# Patient Record
Sex: MALE | Race: OTHER | URBAN - METROPOLITAN AREA
[De-identification: names, ages, dates, MRNs, and addresses within clinical notes are randomized per-mention and may not be internally consistent; named-entity substitution may affect disease eponyms.]

---

## 2023-12-07 ENCOUNTER — OFFICE VISIT (OUTPATIENT)
Dept: AUDIOLOGY | Facility: CLINIC | Age: 28
End: 2023-12-07

## 2023-12-07 ENCOUNTER — OFFICE VISIT (OUTPATIENT)
Dept: OTOLARYNGOLOGY | Facility: CLINIC | Age: 28
End: 2023-12-07

## 2023-12-07 VITALS — WEIGHT: 170 LBS

## 2023-12-07 DIAGNOSIS — H90.42 SENSORINEURAL HEARING LOSS (SNHL) OF LEFT EAR WITH UNRESTRICTED HEARING OF RIGHT EAR: Primary | ICD-10-CM

## 2023-12-07 DIAGNOSIS — H91.93 BILATERAL HEARING LOSS, UNSPECIFIED HEARING LOSS TYPE: Primary | ICD-10-CM

## 2023-12-07 PROCEDURE — 92557 COMPREHENSIVE HEARING TEST: CPT | Performed by: AUDIOLOGIST

## 2023-12-07 PROCEDURE — 99203 OFFICE O/P NEW LOW 30 MIN: CPT | Performed by: OTOLARYNGOLOGY

## 2023-12-07 PROCEDURE — 92567 TYMPANOMETRY: CPT | Performed by: AUDIOLOGIST

## 2023-12-07 RX ORDER — EMTRICITABINE AND TENOFOVIR ALAFENAMIDE 200; 25 MG/1; MG/1
1 TABLET ORAL DAILY
COMMUNITY
Start: 2023-10-23

## 2023-12-07 RX ORDER — CEPHALEXIN 500 MG/1
500 CAPSULE ORAL EVERY 12 HOURS
COMMUNITY
Start: 2023-12-03

## 2023-12-07 RX ORDER — PREDNISONE 10 MG/1
TABLET ORAL
Qty: 44 TABLET | Refills: 0 | Status: SHIPPED | OUTPATIENT
Start: 2023-12-07

## 2023-12-07 RX ORDER — VALACYCLOVIR HYDROCHLORIDE 500 MG/1
500 TABLET, FILM COATED ORAL EVERY 8 HOURS
Qty: 21 TABLET | Refills: 0 | Status: SHIPPED | OUTPATIENT
Start: 2023-12-07 | End: 2023-12-14

## 2023-12-08 NOTE — PROGRESS NOTES
Valene Phoenix is a 33year old male. Chief Complaint   Patient presents with    Follow - Up     ER ringing in left ear, taking oral abx    Nose Problem     Pt reports sinus infections and tonsillitis. HISTORY OF PRESENT ILLNESS    He presents with a history of developing an acute sinus infection and having episodes of tonsillitis in the past.  States that 2 days before Thanksgiving which is about 2 weeks and 2 days ago he started developing some issues with his ear. Went to urgent care on Thanksgiving day 2 weeks ago and at that time was prescribed some low-dose steroids that he took 1 tab daily for about 4 to 5 days. No improvement in his symptoms overall. Denies any dizziness does admit to fullness and pressure sensation in the ear as well as a loss of hearing. Audiogram was performed today based on his complaints and concerns with the finding of normal hearing on the right right with a mild sensorineural hearing loss which is asymmetric on the left with normal tympanograms and speech discrimination scores. Currently without any other signs, symptoms or complaints. Previously tested for COVID which was negative. No longer having cough or throat symptoms. Social History     Socioeconomic History    Marital status:    Tobacco Use    Smoking status: Never     Passive exposure: Never    Smokeless tobacco: Never   Vaping Use    Vaping Use: Never used   Substance and Sexual Activity    Alcohol use: Never    Drug use: Never       History reviewed. No pertinent family history. Past Medical History:   Diagnosis Date    Sinusitis     Tonsillitis        History reviewed. No pertinent surgical history. REVIEW OF SYSTEMS    System Neg/Pos Details   Constitutional Negative Fatigue, fever and weight loss. ENMT Negative Drooling. Eyes Negative Blurred vision and vision changes. Respiratory Negative Dyspnea and wheezing.    Cardio Negative Chest pain, irregular heartbeat/palpitations and syncope. GI Negative Abdominal pain and diarrhea. Endocrine Negative Cold intolerance and heat intolerance. Neuro Negative Tremors. Psych Negative Anxiety and depression. Integumentary Negative Frequent skin infections, pigment change and rash. Hema/Lymph Negative Easy bleeding and easy bruising. PHYSICAL EXAM    Wt 170 lb (77.1 kg)        Constitutional Normal Overall appearance - Normal.   Psychiatric Normal Orientation - Oriented to time, place, person & situation. Appropriate mood and affect. Neck Exam Normal Inspection - Normal. Palpation - Normal. Parotid gland - Normal. Thyroid gland - Normal.   Eyes Normal Conjunctiva - Right: Normal, Left: Normal. Pupil - Right: Normal, Left: Normal. Fundus - Right: Normal, Left: Normal.   Neurological Normal Memory - Normal. Cranial nerves - Cranial nerves II through XII grossly intact. Head/Face Normal Facial features - Normal. Eyebrows - Normal. Skull - Normal.        Nasopharynx Normal External nose - Normal. Lips/teeth/gums - Normal. Tonsils - Normal. Oropharynx - Normal.   Ears Normal Inspection - Right: Normal, Left: Normal. Canal - Right: Normal, Left: Normal. TM - Right: Normal, Left: Normal.   Skin Normal Inspection - Normal.        Lymph Detail Normal Submental. Submandibular. Anterior cervical. Posterior cervical. Supraclavicular. Nose/Mouth/Throat Normal External nose - Normal. Lips/teeth/gums - Normal. Tonsils - Normal. Oropharynx - Normal.   Nose/Mouth/Throat Normal Nares - Right: Normal Left: Normal. Septum -Normal  Turbinates - Right: Normal, Left: Normal.       Current Outpatient Medications:     cephalexin 500 MG Oral Cap, Take 1 capsule (500 mg total) by mouth Q12H., Disp: , Rfl:     DESCOVY 200-25 MG Oral Tab, Take 1 tablet by mouth daily. , Disp: , Rfl:     Pseudoephedrine HCl, Deter, 30 MG Oral Tablet Abuse-Deterrent, Take 30 mg by mouth., Disp: , Rfl:     predniSONE 10 MG Oral Tab, 6 tablets daily day one through 5, 4 tablets daily on days  6 and 7, 2 tablets daily on days 8 and 9, One tablet daily on days 10 and 11., Disp: 44 tablet, Rfl: 0    valACYclovir 500 MG Oral Tab, Take 1 tablet (500 mg total) by mouth every 8 (eight) hours for 7 days. , Disp: 21 tablet, Rfl: 0  ASSESSMENT AND PLAN    1. Bilateral hearing loss, unspecified hearing loss type  Audiogram was reviewed with the patient in the office today demonstrating asymmetric sensorineural hearing loss on the left which is mild in nature at all frequencies. Normal tympanograms and speech discrimination scores. We discussed possibly that he may have had an underlying viral etiology of his sinusitis leading to his sudden onset hearing loss. I have recommended that he start the use of prednisone with a burst and taper over 11 days and Valtrex and return to see me in 2 weeks with repeat audiogram.  - OP REFERRAL TO AUDIOLOGY        This note was prepared using [facility] voice recognition dictation software. As a result errors may occur. When identified these errors have been corrected. While every attempt is made to correct errors during dictation discrepancies may still exist    Alban Lira MD    12/7/2023    9:55 PM

## 2023-12-12 ENCOUNTER — TELEPHONE (OUTPATIENT)
Dept: OTOLARYNGOLOGY | Facility: CLINIC | Age: 28
End: 2023-12-12

## 2023-12-12 NOTE — TELEPHONE ENCOUNTER
Pt stating he is experiencing  swollen glands  with pain,struggling to eat. Would like to speak to a nurse. Please advise     [place]ovenian SPEAKER

## 2023-12-12 NOTE — TELEPHONE ENCOUNTER
Per pt c/o of swollen lymph  nodes bilateral neck, pain 9/10,  especially with movement-side to side or bending down and sore throat. Pt denies fever. Pt has been taking valtrex and prednisone as prescribed. Per  pt to follow up with PCP regarding issues or can go to urgent care. Pt informed of recommendations.

## 2023-12-21 ENCOUNTER — OFFICE VISIT (OUTPATIENT)
Dept: AUDIOLOGY | Facility: CLINIC | Age: 28
End: 2023-12-21

## 2023-12-21 ENCOUNTER — OFFICE VISIT (OUTPATIENT)
Dept: OTOLARYNGOLOGY | Facility: CLINIC | Age: 28
End: 2023-12-21

## 2023-12-21 VITALS — WEIGHT: 170 LBS

## 2023-12-21 DIAGNOSIS — H91.90 HEARING LOSS, UNSPECIFIED HEARING LOSS TYPE, UNSPECIFIED LATERALITY: Primary | ICD-10-CM

## 2023-12-21 DIAGNOSIS — H90.42 SENSORINEURAL HEARING LOSS (SNHL) OF LEFT EAR WITH UNRESTRICTED HEARING OF RIGHT EAR: Primary | ICD-10-CM

## 2023-12-21 PROCEDURE — 99214 OFFICE O/P EST MOD 30 MIN: CPT | Performed by: OTOLARYNGOLOGY

## 2023-12-21 RX ORDER — AZELASTINE 1 MG/ML
2 SPRAY, METERED NASAL 2 TIMES DAILY
Qty: 30 ML | Refills: 3 | Status: SHIPPED | OUTPATIENT
Start: 2023-12-21

## 2023-12-21 RX ORDER — MONTELUKAST SODIUM 10 MG/1
10 TABLET ORAL NIGHTLY
Qty: 30 TABLET | Refills: 3 | Status: SHIPPED | OUTPATIENT
Start: 2023-12-21

## 2023-12-21 NOTE — PROGRESS NOTES
Abigail Corona is a 33year old male. Chief Complaint   Patient presents with    Follow - Up     F/up bilateral hearing loss with repeat audiogram       HISTORY OF PRESENT ILLNESS  He presents with a history of developing an acute sinus infection and having episodes of tonsillitis in the past.  States that 2 days before Thanksgiving which is about 2 weeks and 2 days ago he started developing some issues with his ear. Went to urgent care on Thanksgiving day 2 weeks ago and at that time was prescribed some low-dose steroids that he took 1 tab daily for about 4 to 5 days. No improvement in his symptoms overall. Denies any dizziness does admit to fullness and pressure sensation in the ear as well as a loss of hearing. Audiogram was performed today based on his complaints and concerns with the finding of normal hearing on the right right with a mild sensorineural hearing loss which is asymmetric on the left with normal tympanograms and speech discrimination scores. Currently without any other signs, symptoms or complaints. Previously tested for COVID which was negative. No longer having cough or throat symptoms. 12/21/23 audiogram was performed today demonstrating slight improvement in his pure-tone thresholds at the highest frequencies on the left. Went from 60-45 dB at about 8000 Hz. No other changes at other frequencies. He notes his ear to be less stable and the ringing to be less loud. Overall he feels that the not left ear. Also complains of chronic nasal obstruction cannot breathe out of his nose especially when he sleeps he feels that he gets super congested and obstructed. Social History     Socioeconomic History    Marital status:    Tobacco Use    Smoking status: Never     Passive exposure: Never    Smokeless tobacco: Never   Vaping Use    Vaping Use: Never used   Substance and Sexual Activity    Alcohol use: Never    Drug use: Never       History reviewed.  No pertinent family history. Past Medical History:   Diagnosis Date    Sinusitis     Tonsillitis        History reviewed. No pertinent surgical history. REVIEW OF SYSTEMS    System Neg/Pos Details   Constitutional Negative Fatigue, fever and weight loss. ENMT Negative Drooling. Eyes Negative Blurred vision and vision changes. Respiratory Negative Dyspnea and wheezing. Cardio Negative Chest pain, irregular heartbeat/palpitations and syncope. GI Negative Abdominal pain and diarrhea. Endocrine Negative Cold intolerance and heat intolerance. Neuro Negative Tremors. Psych Negative Anxiety and depression. Integumentary Negative Frequent skin infections, pigment change and rash. Hema/Lymph Negative Easy bleeding and easy bruising. PHYSICAL EXAM    Wt 170 lb (77.1 kg)        Constitutional Normal Overall appearance - Normal.   Psychiatric Normal Orientation - Oriented to time, place, person & situation. Appropriate mood and affect. Neck Exam Normal Inspection - Normal. Palpation - Normal. Parotid gland - Normal. Thyroid gland - Normal.   Eyes Normal Conjunctiva - Right: Normal, Left: Normal. Pupil - Right: Normal, Left: Normal. Fundus - Right: Normal, Left: Normal.   Neurological Normal Memory - Normal. Cranial nerves - Cranial nerves II through XII grossly intact. Head/Face Normal Facial features - Normal. Eyebrows - Normal. Skull - Normal.        Nasopharynx Normal External nose - Normal. Lips/teeth/gums - Normal. Tonsils - Normal. Oropharynx - Normal.   Ears Normal Inspection - Right: Normal, Left: Normal. Canal - Right: Normal, Left: Normal. TM - Right: Normal, Left: Normal.   Skin Normal Inspection - Normal.        Lymph Detail Normal Submental. Submandibular. Anterior cervical. Posterior cervical. Supraclavicular.         Nose/Mouth/Throat Normal External nose - Normal. Lips/teeth/gums - Normal. Tonsils - Normal. Oropharynx - Normal.   Nose/Mouth/Throat Normal Nares - Right: Normal Left: Normal. Septum - deviated to the right turbinates - Right: Normal, Left: Moderate hypertrophy       Current Outpatient Medications:     cephalexin 500 MG Oral Cap, Take 1 capsule (500 mg total) by mouth Q12H., Disp: , Rfl:     DESCOVY 200-25 MG Oral Tab, Take 1 tablet by mouth daily. , Disp: , Rfl:     Pseudoephedrine HCl, Deter, 30 MG Oral Tablet Abuse-Deterrent, Take 30 mg by mouth., Disp: , Rfl:     predniSONE 10 MG Oral Tab, 6 tablets daily day one through 5, 4 tablets daily on days  6 and 7, 2 tablets daily on days 8 and 9, One tablet daily on days 10 and 11., Disp: 44 tablet, Rfl: 0  ASSESSMENT AND PLAN    1. Hearing loss, unspecified hearing loss type, unspecified laterality  Deviated septum to the right. Start Singulair riding the Astelin nasal spray return to see me in 1 month for reevaluation. May benefit from septoplasty and turbinate reduction in the future. Avoid improvement at the highest frequencies on the left side. Normal hearing on the right. Repeat audiogram in 6 months. Greater than 30 minutes spent with patient in discussions regarding his hearing tonsillar issues and nasal issues  - OP REFERRAL TO AUDIOLOGY        This note was prepared using [employer] [facility] [employer] voice recognition dictation software. As a result errors may occur. When identified these errors have been corrected. While every attempt is made to correct errors during dictation discrepancies may still exist    John D. Darlene Halsted, MD    12/21/2023    11:08 AM

## 2024-01-25 ENCOUNTER — OFFICE VISIT (OUTPATIENT)
Dept: OTOLARYNGOLOGY | Facility: CLINIC | Age: 29
End: 2024-01-25

## 2024-01-25 DIAGNOSIS — R06.83 SNORING: Primary | ICD-10-CM

## 2024-01-25 DIAGNOSIS — G47.33 OSA (OBSTRUCTIVE SLEEP APNEA): ICD-10-CM

## 2024-01-25 DIAGNOSIS — J34.2 DEVIATED NASAL SEPTUM: ICD-10-CM

## 2024-01-25 PROCEDURE — 99214 OFFICE O/P EST MOD 30 MIN: CPT | Performed by: OTOLARYNGOLOGY

## 2024-01-25 RX ORDER — FLUTICASONE PROPIONATE 50 MCG
1 SPRAY, SUSPENSION (ML) NASAL 2 TIMES DAILY
Qty: 16 G | Refills: 3 | Status: SHIPPED | OUTPATIENT
Start: 2024-01-25

## 2024-01-25 RX ORDER — AMOXICILLIN AND CLAVULANATE POTASSIUM 875; 125 MG/1; MG/1
1 TABLET, FILM COATED ORAL EVERY 12 HOURS
Qty: 20 TABLET | Refills: 0 | Status: SHIPPED | OUTPATIENT
Start: 2024-01-25

## 2024-01-25 NOTE — PROGRESS NOTES
Gayla Eldridge is a 28 year old male.    Chief Complaint   Patient presents with    Follow - Up     Medication reevaluation, reports worsening.   Snoring difficulty breathing with yellow mucus        HISTORY OF PRESENT ILLNESS  He presents with a history of developing an acute sinus infection and having episodes of tonsillitis in the past.  States that 2 days before Thanksgiving which is about 2 weeks and 2 days ago he started developing some issues with his ear.  Went to urgent care on Thanksgiving day 2 weeks ago and at that time was prescribed some low-dose steroids that he took 1 tab daily for about 4 to 5 days.  No improvement in his symptoms overall.  Denies any dizziness does admit to fullness and pressure sensation in the ear as well as a loss of hearing.  Audiogram was performed today based on his complaints and concerns with the finding of normal hearing on the right right with a mild sensorineural hearing loss which is asymmetric on the left with normal tympanograms and speech discrimination scores.  Currently without any other signs, symptoms or complaints.  Previously tested for COVID which was negative.  No longer having cough or throat symptoms.     12/21/23 audiogram was performed today demonstrating slight improvement in his pure-tone thresholds at the highest frequencies on the left.  Went from 60-45 dB at about 8000 Hz.  No other changes at other frequencies.  He notes his ear to be less stable and the ringing to be less loud.  Overall he feels that the not left ear.  Also complains of chronic nasal obstruction cannot breathe out of his nose especially when he sleeps he feels that he gets super congested and obstructed.     1/25/24 he has been using Claritin-D Singulair Astelin as a spray does not feel that spray is actually helping him.  Now blowing out green-yellowish material and very obstructed bilaterally.  Partner is here with him and states that he notes that he stops breathing at night  and snores heroically.  He has noted obstructive symptoms suggestive of sleep apnea.  Previously noted to have a deviated septum.      Social History     Socioeconomic History    Marital status:    Tobacco Use    Smoking status: Never     Passive exposure: Never    Smokeless tobacco: Never   Vaping Use    Vaping Use: Never used   Substance and Sexual Activity    Alcohol use: Never    Drug use: Never       History reviewed. No pertinent family history.    Past Medical History:   Diagnosis Date    Sinusitis     Tonsillitis        History reviewed. No pertinent surgical history.      REVIEW OF SYSTEMS    System Neg/Pos Details   Constitutional Negative Fatigue, fever and weight loss.   ENMT Negative Drooling.   Eyes Negative Blurred vision and vision changes.   Respiratory Negative Dyspnea and wheezing.   Cardio Negative Chest pain, irregular heartbeat/palpitations and syncope.   GI Negative Abdominal pain and diarrhea.   Endocrine Negative Cold intolerance and heat intolerance.   Neuro Negative Tremors.   Psych Negative Anxiety and depression.   Integumentary Negative Frequent skin infections, pigment change and rash.   Hema/Lymph Negative Easy bleeding and easy bruising.           PHYSICAL EXAM    There were no vitals taken for this visit.       Constitutional Normal Overall appearance - Normal.   Psychiatric Normal Orientation - Oriented to time, place, person & situation. Appropriate mood and affect.   Neck Exam Normal Inspection - Normal. Palpation - Normal. Parotid gland - Normal. Thyroid gland - Normal.   Eyes Normal Conjunctiva - Right: Normal, Left: Normal. Pupil - Right: Normal, Left: Normal. Fundus - Right: Normal, Left: Normal.   Neurological Normal Memory - Normal. Cranial nerves - Cranial nerves II through XII grossly intact.   Head/Face Normal Facial features - Normal. Eyebrows - Normal. Skull - Normal.        Nasopharynx Normal External nose - Normal. Lips/teeth/gums - Normal. Tonsils - Normal.  Oropharynx - Normal.   Ears Normal Inspection - Right: Normal, Left: Normal. Canal - Right: Normal, Left: Normal. TM - Right: Normal, Left: Normal.   Skin Normal Inspection - Normal.        Lymph Detail Normal Submental. Submandibular. Anterior cervical. Posterior cervical. Supraclavicular.        Nose/Mouth/Throat Normal External nose - Normal. Lips/teeth/gums - Normal. Tonsils - Normal. Oropharynx - Normal.   Nose/Mouth/Throat Normal Nares - Right: Normal Left: Normal. Septum -deviated turbinates - Right: Normal, Left: Normal.  Congested nasal mucosa with some mucopurulence noted bilaterally.       Current Outpatient Medications:     montelukast 10 MG Oral Tab, Take 1 tablet (10 mg total) by mouth nightly., Disp: 30 tablet, Rfl: 3    loratadine-pseudoephedrine ER 5-120 MG Oral Tablet 12 Hr, Take 1 tablet by mouth every 12 (twelve) hours., Disp: 60 tablet, Rfl: 3    azelastine 0.1 % Nasal Solution, 2 sprays by Nasal route 2 (two) times daily., Disp: 30 mL, Rfl: 3    cephalexin 500 MG Oral Cap, Take 1 capsule (500 mg total) by mouth Q12H., Disp: , Rfl:     DESCOVY 200-25 MG Oral Tab, Take 1 tablet by mouth daily., Disp: , Rfl:     Pseudoephedrine HCl, Deter, 30 MG Oral Tablet Abuse-Deterrent, Take 30 mg by mouth., Disp: , Rfl:     predniSONE 10 MG Oral Tab, 6 tablets daily day one through 5, 4 tablets daily on days  6 and 7, 2 tablets daily on days 8 and 9, One tablet daily on days 10 and 11., Disp: 44 tablet, Rfl: 0  ASSESSMENT AND PLAN    1. Snoring  - Home Sleep Apnea Test (Adult pt only) - Sleep consult required for Medicare pts    2. INGE (obstructive sleep apnea)    3. Deviated nasal septum  Appears to have an acute sinus infection with nasal mucopurulence and blood.  Start Augmentin for 10 days continue Claritin-D Singulair I will switch him from Astelin to fluticasone.  I did recommend a sleep study to evaluate for sleep apnea.  Return to see me after the study to discuss further management.  Constant nasal  obstruction may require nasal surgery for his deviated septum in order to tolerate CPAP in the future.        This note was prepared using [employer] voice recognition dictation software. As a result errors may occur. When identified these errors have been corrected. While every attempt is made to correct errors during dictation discrepancies may still exist    Alban Cordon MD    1/25/2024    10:51 AM

## 2024-02-07 ENCOUNTER — TELEPHONE (OUTPATIENT)
Dept: OTOLARYNGOLOGY | Facility: CLINIC | Age: 29
End: 2024-02-07

## 2024-02-07 NOTE — TELEPHONE ENCOUNTER
Malawian speaking pt has dizziness and buzzing in ear - asking if there is rx he can try or does he need to be seen - he is too dizzy to drive and has no one to take him to appt

## 2024-02-08 RX ORDER — MECLIZINE HYDROCHLORIDE 25 MG/1
25 TABLET ORAL EVERY 8 HOURS PRN
Qty: 20 TABLET | Refills: 0 | OUTPATIENT
Start: 2024-02-08

## 2024-02-08 NOTE — TELEPHONE ENCOUNTER
Pt returning call. States pressure in head is causing a lot of dizziness. Please call thank you.

## 2024-02-08 NOTE — TELEPHONE ENCOUNTER
Contacted pharmacistClarice at [id number]  And per clarice an order for meclizine 25 mg. Orally every 8 hours prn for dizziness was phoned in for patient.   Contacted life partner, Audi (confirmed on file verbal release) using [place] interpretor, Chloe #[phone]  And Audi will  meclizine today and start the medication for dizziness as directed.  Patient or Audi will contact office if no improvement in dizziness while using meclizine. Also will find out which location for sleep study is covered by patient's insurance, audi will call insurance for this information.   No further questions and verbalized understanding.

## 2024-04-24 RX ORDER — MONTELUKAST SODIUM 10 MG/1
10 TABLET ORAL NIGHTLY
Qty: 30 TABLET | Refills: 3 | Status: SHIPPED | OUTPATIENT
Start: 2024-04-24

## 2024-11-19 ENCOUNTER — TELEPHONE (OUTPATIENT)
Dept: OTOLARYNGOLOGY | Facility: CLINIC | Age: 29
End: 2024-11-19

## 2024-11-19 NOTE — TELEPHONE ENCOUNTER
Patient asking for refill of meclizine and prednisone before next office visit on 11/29. Please advise.       Current Outpatient Medications:       meclizine 25 MG Oral Tab, Take 1 tablet (25 mg total) by mouth every 8 (eight) hours as needed for Dizziness., Disp: 20 tablet, Rfl: 0      predniSONE 10 MG Oral Tab, 6 tablets daily day one through 5, 4 tablets daily on days  6 and 7, 2 tablets daily on days 8 and 9, One tablet daily on days 10 and 11., Disp: 44 tablet, Rfl: 0

## 2024-11-19 NOTE — TELEPHONE ENCOUNTER
This refill request is being sent to the provider for the following reason:  []Patient has not had an appointment within the past 12 months but has made an appointment on: ___  [x]Medication is not within protocol - Meclizine and Prednisone   []Patient did not complete follow up recommendations  [x]Other: ___Appt scheduled for 11/29 to discuss surgery    Last office visit was:  1/25/24, has issues with his breathing and inflamed tonsils, wants to know if you would prescribe these meds for him prior to his upcoming appt next week?

## 2024-11-29 ENCOUNTER — OFFICE VISIT (OUTPATIENT)
Dept: OTOLARYNGOLOGY | Facility: CLINIC | Age: 29
End: 2024-11-29

## 2024-11-29 ENCOUNTER — OFFICE VISIT (OUTPATIENT)
Dept: AUDIOLOGY | Facility: CLINIC | Age: 29
End: 2024-11-29

## 2024-11-29 DIAGNOSIS — H90.42 SENSORINEURAL HEARING LOSS (SNHL) OF LEFT EAR WITH UNRESTRICTED HEARING OF RIGHT EAR: Primary | ICD-10-CM

## 2024-11-29 DIAGNOSIS — G47.30 SLEEP APNEA IN ADULT: ICD-10-CM

## 2024-11-29 DIAGNOSIS — H93.19 TINNITUS, UNSPECIFIED LATERALITY: Primary | ICD-10-CM

## 2024-11-29 DIAGNOSIS — J34.2 DEVIATED NASAL SEPTUM: ICD-10-CM

## 2024-11-29 PROCEDURE — 92567 TYMPANOMETRY: CPT | Performed by: AUDIOLOGIST

## 2024-11-29 PROCEDURE — 99214 OFFICE O/P EST MOD 30 MIN: CPT | Performed by: OTOLARYNGOLOGY

## 2024-11-29 PROCEDURE — 92557 COMPREHENSIVE HEARING TEST: CPT | Performed by: AUDIOLOGIST

## 2024-11-29 RX ORDER — MONTELUKAST SODIUM 10 MG/1
10 TABLET ORAL NIGHTLY
Qty: 30 TABLET | Refills: 3 | Status: SHIPPED | OUTPATIENT
Start: 2024-11-29

## 2024-11-29 RX ORDER — IPRATROPIUM BROMIDE 21 UG/1
1 SPRAY, METERED NASAL 3 TIMES DAILY
Qty: 1 EACH | Refills: 3 | Status: SHIPPED | OUTPATIENT
Start: 2024-11-29

## 2024-11-29 NOTE — PROGRESS NOTES
Gayla Eldridge is a 29 year old male.    Chief Complaint   Patient presents with    Tonsil Problem     Patient is here due enlarged tonsils     Ringing In Ear     Reports dizziness       HISTORY OF PRESENT ILLNESS  He presents with a history of developing an acute sinus infection and having episodes of tonsillitis in the past.  States that 2 days before Thanksgiving which is about 2 weeks and 2 days ago he started developing some issues with his ear.  Went to urgent care on Thanksgiving day 2 weeks ago and at that time was prescribed some low-dose steroids that he took 1 tab daily for about 4 to 5 days.  No improvement in his symptoms overall.  Denies any dizziness does admit to fullness and pressure sensation in the ear as well as a loss of hearing.  Audiogram was performed today based on his complaints and concerns with the finding of normal hearing on the right right with a mild sensorineural hearing loss which is asymmetric on the left with normal tympanograms and speech discrimination scores.  Currently without any other signs, symptoms or complaints.  Previously tested for COVID which was negative.  No longer having cough or throat symptoms.     12/21/23 audiogram was performed today demonstrating slight improvement in his pure-tone thresholds at the highest frequencies on the left.  Went from 60-45 dB at about 8000 Hz.  No other changes at other frequencies.  He notes his ear to be less stable and the ringing to be less loud.  Overall he feels that the not left ear.  Also complains of chronic nasal obstruction cannot breathe out of his nose especially when he sleeps he feels that he gets super congested and obstructed.     1/25/24 he has been using Claritin-D Singulair Astelin as a spray does not feel that spray is actually helping him.  Now blowing out green-yellowish material and very obstructed bilaterally.  Partner is here with him and states that he notes that he stops breathing at night and snores  heroically.  He has noted obstructive symptoms suggestive of sleep apnea.  Previously noted to have a deviated septum.      11/29/24 he presents today with multiple complaints.  I last saw him in January of last year at that time I did recommend that he undergo a sleep study.  At that time he had different insurance and states that they would not cover the treatment so he now presents today with new insurance wishing to discuss multiple problems.  Complains of worsening hearing loss on the left.  Known history of mild downsloping to moderate sensory hearing loss.  Audiogram repeated today demonstrates completely unchanged hearing at all frequencies on the left side.  Normal speech discrimination scores and tympanograms.  Also complains of dizziness.  He has had in the past but has not had any episodes up until recently.  Denies any new stressors.  Dizziness in the past thought to be secondary to his left inner ear injury causing the hearing loss.  Suspected viral infection.  Also complains of continued issues with throat clearing cough and nasal congestion.  He is here with his friend who mentions that he snores directly and doors down the hallway.  There have been noted episodes of obstruction when he sleeps..      Social History     Socioeconomic History    Marital status:    Tobacco Use    Smoking status: Never     Passive exposure: Never    Smokeless tobacco: Never   Vaping Use    Vaping status: Never Used   Substance and Sexual Activity    Alcohol use: Never    Drug use: Never       History reviewed. No pertinent family history.    Past Medical History:    Sinusitis    Tonsillitis       History reviewed. No pertinent surgical history.      REVIEW OF SYSTEMS    System Neg/Pos Details   Constitutional Negative Fatigue, fever and weight loss.   ENMT Negative Drooling.   Eyes Negative Blurred vision and vision changes.   Respiratory Negative Dyspnea and wheezing.   Cardio Negative Chest pain, irregular  heartbeat/palpitations and syncope.   GI Negative Abdominal pain and diarrhea.   Endocrine Negative Cold intolerance and heat intolerance.   Neuro Negative Tremors.   Psych Negative Anxiety and depression.   Integumentary Negative Frequent skin infections, pigment change and rash.   Hema/Lymph Negative Easy bleeding and easy bruising.           PHYSICAL EXAM    There were no vitals taken for this visit.       Constitutional Normal Overall appearance - Normal.   Psychiatric Normal Orientation - Oriented to time, place, person & situation. Appropriate mood and affect.   Neck Exam Normal Inspection - Normal. Palpation - Normal. Parotid gland - Normal. Thyroid gland - Normal.   Eyes Normal Conjunctiva - Right: Normal, Left: Normal. Pupil - Right: Normal, Left: Normal. Fundus - Right: Normal, Left: Normal.   Neurological Normal Memory - Normal. Cranial nerves - Cranial nerves II through XII grossly intact.   Head/Face Normal Facial features - Normal. Eyebrows - Normal. Skull - Normal.        Nasopharynx Normal External nose - Normal. Lips/teeth/gums - Normal. Tonsils - Normal. Oropharynx - Normal.   Ears Normal Inspection - Right: Normal, Left: Normal. Canal - Right: Normal, Left: Normal. TM - Right: Normal, Left: Normal.   Skin Normal Inspection - Normal.        Lymph Detail Normal Submental. Submandibular. Anterior cervical. Posterior cervical. Supraclavicular.        Nose/Mouth/Throat Normal External nose - Normal. Lips/teeth/gums - Normal. Tonsils - Normal. Oropharynx - Normal.   Nose/Mouth/Throat Normal Nares - Right: Normal Left: Normal. Septum -deviated to the left turbinates - Right: Normal, Left: Normal.  Postnasal discharge with erythema       Current Outpatient Medications:     montelukast 10 MG Oral Tab, Take 1 tablet (10 mg total) by mouth nightly., Disp: 30 tablet, Rfl: 3    loratadine-pseudoephedrine ER 5-120 MG Oral Tablet 12 Hr, Take 1 tablet by mouth every 12 (twelve) hours., Disp: 60 tablet, Rfl: 3     ipratropium 0.03 % Nasal Solution, 1 spray by Nasal route 3 (three) times daily., Disp: 1 each, Rfl: 3    MONTELUKAST 10 MG Oral Tab, TAKE 1 TABLET BY MOUTH EVERY DAY AT NIGHT, Disp: 30 tablet, Rfl: 3    meclizine 25 MG Oral Tab, Take 1 tablet (25 mg total) by mouth every 8 (eight) hours as needed for Dizziness., Disp: 20 tablet, Rfl: 0    fluticasone propionate 50 MCG/ACT Nasal Suspension, 1 spray by Nasal route 2 (two) times daily., Disp: 16 g, Rfl: 3    amoxicillin clavulanate 875-125 MG Oral Tab, Take 1 tablet by mouth every 12 (twelve) hours., Disp: 20 tablet, Rfl: 0    loratadine-pseudoephedrine ER 5-120 MG Oral Tablet 12 Hr, Take 1 tablet by mouth every 12 (twelve) hours., Disp: 60 tablet, Rfl: 3    azelastine 0.1 % Nasal Solution, 2 sprays by Nasal route 2 (two) times daily., Disp: 30 mL, Rfl: 3    cephalexin 500 MG Oral Cap, Take 1 capsule (500 mg total) by mouth Q12H., Disp: , Rfl:     DESCOVY 200-25 MG Oral Tab, Take 1 tablet by mouth daily., Disp: , Rfl:     Pseudoephedrine HCl, Deter, 30 MG Oral Tablet Abuse-Deterrent, Take 30 mg by mouth., Disp: , Rfl:     predniSONE 10 MG Oral Tab, 6 tablets daily day one through 5, 4 tablets daily on days  6 and 7, 2 tablets daily on days 8 and 9, One tablet daily on days 10 and 11., Disp: 44 tablet, Rfl: 0  ASSESSMENT AND PLAN    1. Tinnitus, unspecified laterality  - Audiology Referral - [facility] ([facility])    2. Sleep apnea in adult  - Home Sleep Apnea Test (Adult pt only) - Sleep consult required for Medicare pts  - General sleep study; Future    3. Deviated nasal septum  Slight septal deviation to the right.  Significant post nasal discharge which I think is causing his throat symptoms.  I did recommend that he resume Claritin-D Singulair I will now start him on ipratropium as Flonase and Astelin did not help him at all.  Significant postnasal drip issues including throat clearing and cough.  With respect to his hearing we did review his hearing test  which demonstrates unchanged hearing I suspect that the tinnitus in the left ear is simply due to his underlying asymmetric hearing loss.  No intervention with MRI at this time due to stability of his hearing.  In addition never went through with a sleep study.  He did play me an audio of him snoring and it does sound like he has obstructive events.  I once again order another home sleep study to be performed at his discretion and we will contact him with the results of his studies.  With respect to his dizziness I suspect that this is an exacerbation of his previous dizziness which had resolved up until recently.  Home vestibular exercises were given to him and if no better would recommend vestibular rehab.  - loratadine-pseudoephedrine ER 5-120 MG Oral Tablet 12 Hr; Take 1 tablet by mouth every 12 (twelve) hours.  Dispense: 60 tablet; Refill: 3        This note was prepared using Dragon [employer] voice recognition dictation software. As a result errors may occur. When identified these errors have been corrected. While every attempt is made to correct errors during dictation discrepancies may still exist    Alban Cordon MD    11/29/2024    12:39 PM

## 2025-05-30 ENCOUNTER — OFFICE VISIT (OUTPATIENT)
Dept: SLEEP CENTER | Age: 30
End: 2025-05-30
Attending: OTOLARYNGOLOGY
Payer: COMMERCIAL

## 2025-05-30 DIAGNOSIS — G47.30 SLEEP APNEA IN ADULT: ICD-10-CM

## 2025-05-30 PROCEDURE — 95806 SLEEP STUDY UNATT&RESP EFFT: CPT

## 2025-06-02 ENCOUNTER — TELEPHONE (OUTPATIENT)
Dept: OTOLARYNGOLOGY | Facility: CLINIC | Age: 30
End: 2025-06-02

## 2025-06-02 RX ORDER — MONTELUKAST SODIUM 10 MG/1
10 TABLET ORAL NIGHTLY
Qty: 90 TABLET | Refills: 0 | Status: SHIPPED | OUTPATIENT
Start: 2025-06-02

## 2025-06-02 NOTE — TELEPHONE ENCOUNTER
Per patient states his tonsils are very swollen, and has fever, asking if Dr. Cordon can prescribe medication. Please call thank you.

## 2025-06-02 NOTE — TELEPHONE ENCOUNTER
Advised pt that he has never been seen for tonsil issues in office by  and with patient having fever, he should be seen by PCP or urgent care and strep test may be needed. Pt agreed with plan.

## 2025-06-03 ENCOUNTER — TELEPHONE (OUTPATIENT)
Dept: SLEEP CENTER | Age: 30
End: 2025-06-03

## 2025-06-09 ENCOUNTER — TELEPHONE (OUTPATIENT)
Dept: OTOLARYNGOLOGY | Facility: CLINIC | Age: 30
End: 2025-06-09

## 2025-06-09 NOTE — TELEPHONE ENCOUNTER
Spoke with Gayal and informed him of the sleep study results: severe obstructive sleep apnea. Dr. Cordon wants him to return to the clinic to go over the results of the study and to discuss future management options. Appt scheduled for 6/13/25.

## 2025-06-13 ENCOUNTER — TELEPHONE (OUTPATIENT)
Dept: OTOLARYNGOLOGY | Facility: CLINIC | Age: 30
End: 2025-06-13

## 2025-08-14 ENCOUNTER — ORDER TRANSCRIPTION (OUTPATIENT)
Dept: SLEEP CENTER | Age: 30
End: 2025-08-14